# Patient Record
Sex: MALE | Race: ASIAN | NOT HISPANIC OR LATINO
[De-identification: names, ages, dates, MRNs, and addresses within clinical notes are randomized per-mention and may not be internally consistent; named-entity substitution may affect disease eponyms.]

---

## 2021-04-05 ENCOUNTER — APPOINTMENT (OUTPATIENT)
Dept: DISASTER EMERGENCY | Facility: CLINIC | Age: 46
End: 2021-04-05

## 2021-04-05 DIAGNOSIS — Z01.818 ENCOUNTER FOR OTHER PREPROCEDURAL EXAMINATION: ICD-10-CM

## 2021-04-05 PROBLEM — Z00.00 ENCOUNTER FOR PREVENTIVE HEALTH EXAMINATION: Status: ACTIVE | Noted: 2021-04-05

## 2021-04-08 ENCOUNTER — OUTPATIENT (OUTPATIENT)
Dept: OUTPATIENT SERVICES | Facility: HOSPITAL | Age: 46
LOS: 1 days | End: 2021-04-08

## 2021-04-08 DIAGNOSIS — Z20.822 CONTACT WITH AND (SUSPECTED) EXPOSURE TO COVID-19: ICD-10-CM

## 2021-04-08 LAB — SARS-COV-2 RNA SPEC QL NAA+PROBE: SIGNIFICANT CHANGE UP

## 2021-04-19 ENCOUNTER — INPATIENT (INPATIENT)
Facility: HOSPITAL | Age: 46
LOS: 1 days | Discharge: HOME | End: 2021-04-21
Attending: SURGERY | Admitting: SURGERY
Payer: MEDICAID

## 2021-04-19 VITALS
DIASTOLIC BLOOD PRESSURE: 97 MMHG | SYSTOLIC BLOOD PRESSURE: 149 MMHG | HEART RATE: 73 BPM | RESPIRATION RATE: 16 BRPM | OXYGEN SATURATION: 100 % | TEMPERATURE: 98 F

## 2021-04-19 DIAGNOSIS — Z98.890 OTHER SPECIFIED POSTPROCEDURAL STATES: Chronic | ICD-10-CM

## 2021-04-19 DIAGNOSIS — K85.90 ACUTE PANCREATITIS WITHOUT NECROSIS OR INFECTION, UNSPECIFIED: ICD-10-CM

## 2021-04-19 LAB
ALBUMIN SERPL ELPH-MCNC: 4.5 G/DL — SIGNIFICANT CHANGE UP (ref 3.3–5)
ALP SERPL-CCNC: 238 U/L — HIGH (ref 40–120)
ALT FLD-CCNC: 602 U/L — HIGH (ref 4–41)
ANION GAP SERPL CALC-SCNC: 11 MMOL/L — SIGNIFICANT CHANGE UP (ref 7–14)
APTT BLD: 29.1 SEC — SIGNIFICANT CHANGE UP (ref 27–36.3)
AST SERPL-CCNC: 261 U/L — HIGH (ref 4–40)
BASOPHILS # BLD AUTO: 0.03 K/UL — SIGNIFICANT CHANGE UP (ref 0–0.2)
BASOPHILS NFR BLD AUTO: 0.3 % — SIGNIFICANT CHANGE UP (ref 0–2)
BILIRUB SERPL-MCNC: 2.3 MG/DL — HIGH (ref 0.2–1.2)
BLD GP AB SCN SERPL QL: NEGATIVE — SIGNIFICANT CHANGE UP
BUN SERPL-MCNC: 8 MG/DL — SIGNIFICANT CHANGE UP (ref 7–23)
CALCIUM SERPL-MCNC: 9.5 MG/DL — SIGNIFICANT CHANGE UP (ref 8.4–10.5)
CHLORIDE SERPL-SCNC: 101 MMOL/L — SIGNIFICANT CHANGE UP (ref 98–107)
CO2 SERPL-SCNC: 23 MMOL/L — SIGNIFICANT CHANGE UP (ref 22–31)
CREAT SERPL-MCNC: 0.79 MG/DL — SIGNIFICANT CHANGE UP (ref 0.5–1.3)
EOSINOPHIL # BLD AUTO: 0.03 K/UL — SIGNIFICANT CHANGE UP (ref 0–0.5)
EOSINOPHIL NFR BLD AUTO: 0.3 % — SIGNIFICANT CHANGE UP (ref 0–6)
GLUCOSE SERPL-MCNC: 132 MG/DL — HIGH (ref 70–99)
HCT VFR BLD CALC: 43.1 % — SIGNIFICANT CHANGE UP (ref 39–50)
HGB BLD-MCNC: 14.1 G/DL — SIGNIFICANT CHANGE UP (ref 13–17)
IANC: 7.09 K/UL — SIGNIFICANT CHANGE UP (ref 1.5–8.5)
IMM GRANULOCYTES NFR BLD AUTO: 0.2 % — SIGNIFICANT CHANGE UP (ref 0–1.5)
INR BLD: 1.12 RATIO — SIGNIFICANT CHANGE UP (ref 0.88–1.16)
LYMPHOCYTES # BLD AUTO: 1.19 K/UL — SIGNIFICANT CHANGE UP (ref 1–3.3)
LYMPHOCYTES # BLD AUTO: 13.1 % — SIGNIFICANT CHANGE UP (ref 13–44)
MCHC RBC-ENTMCNC: 28.5 PG — SIGNIFICANT CHANGE UP (ref 27–34)
MCHC RBC-ENTMCNC: 32.7 GM/DL — SIGNIFICANT CHANGE UP (ref 32–36)
MCV RBC AUTO: 87.1 FL — SIGNIFICANT CHANGE UP (ref 80–100)
MONOCYTES # BLD AUTO: 0.71 K/UL — SIGNIFICANT CHANGE UP (ref 0–0.9)
MONOCYTES NFR BLD AUTO: 7.8 % — SIGNIFICANT CHANGE UP (ref 2–14)
NEUTROPHILS # BLD AUTO: 7.09 K/UL — SIGNIFICANT CHANGE UP (ref 1.8–7.4)
NEUTROPHILS NFR BLD AUTO: 78.3 % — HIGH (ref 43–77)
NRBC # BLD: 0 /100 WBCS — SIGNIFICANT CHANGE UP
NRBC # FLD: 0 K/UL — SIGNIFICANT CHANGE UP
PLATELET # BLD AUTO: 182 K/UL — SIGNIFICANT CHANGE UP (ref 150–400)
POTASSIUM SERPL-MCNC: 4 MMOL/L — SIGNIFICANT CHANGE UP (ref 3.5–5.3)
POTASSIUM SERPL-SCNC: 4 MMOL/L — SIGNIFICANT CHANGE UP (ref 3.5–5.3)
PROT SERPL-MCNC: 7.1 G/DL — SIGNIFICANT CHANGE UP (ref 6–8.3)
PROTHROM AB SERPL-ACNC: 12.7 SEC — SIGNIFICANT CHANGE UP (ref 10.6–13.6)
RBC # BLD: 4.95 M/UL — SIGNIFICANT CHANGE UP (ref 4.2–5.8)
RBC # FLD: 13.2 % — SIGNIFICANT CHANGE UP (ref 10.3–14.5)
RH IG SCN BLD-IMP: POSITIVE — SIGNIFICANT CHANGE UP
SODIUM SERPL-SCNC: 135 MMOL/L — SIGNIFICANT CHANGE UP (ref 135–145)
TROPONIN T, HIGH SENSITIVITY RESULT: 7 NG/L — SIGNIFICANT CHANGE UP
TROPONIN T, HIGH SENSITIVITY RESULT: 7 NG/L — SIGNIFICANT CHANGE UP
WBC # BLD: 9.07 K/UL — SIGNIFICANT CHANGE UP (ref 3.8–10.5)
WBC # FLD AUTO: 9.07 K/UL — SIGNIFICANT CHANGE UP (ref 3.8–10.5)

## 2021-04-19 PROCEDURE — 99223 1ST HOSP IP/OBS HIGH 75: CPT | Mod: GC

## 2021-04-19 PROCEDURE — 76705 ECHO EXAM OF ABDOMEN: CPT | Mod: 26

## 2021-04-19 PROCEDURE — 71046 X-RAY EXAM CHEST 2 VIEWS: CPT | Mod: 26

## 2021-04-19 PROCEDURE — 93010 ELECTROCARDIOGRAM REPORT: CPT

## 2021-04-19 PROCEDURE — 99285 EMERGENCY DEPT VISIT HI MDM: CPT | Mod: 25

## 2021-04-19 PROCEDURE — 74177 CT ABD & PELVIS W/CONTRAST: CPT | Mod: 26

## 2021-04-19 RX ORDER — ACETAMINOPHEN 500 MG
1000 TABLET ORAL ONCE
Refills: 0 | Status: DISCONTINUED | OUTPATIENT
Start: 2021-04-19 | End: 2021-04-21

## 2021-04-19 RX ORDER — ENOXAPARIN SODIUM 100 MG/ML
40 INJECTION SUBCUTANEOUS DAILY
Refills: 0 | Status: DISCONTINUED | OUTPATIENT
Start: 2021-04-19 | End: 2021-04-21

## 2021-04-19 RX ORDER — SODIUM CHLORIDE 9 MG/ML
1000 INJECTION, SOLUTION INTRAVENOUS
Refills: 0 | Status: DISCONTINUED | OUTPATIENT
Start: 2021-04-19 | End: 2021-04-21

## 2021-04-19 RX ORDER — FAMOTIDINE 10 MG/ML
20 INJECTION INTRAVENOUS ONCE
Refills: 0 | Status: COMPLETED | OUTPATIENT
Start: 2021-04-19 | End: 2021-04-19

## 2021-04-19 RX ADMIN — SODIUM CHLORIDE 100 MILLILITER(S): 9 INJECTION, SOLUTION INTRAVENOUS at 22:38

## 2021-04-19 RX ADMIN — FAMOTIDINE 20 MILLIGRAM(S): 10 INJECTION INTRAVENOUS at 14:02

## 2021-04-19 NOTE — ED ADULT TRIAGE NOTE - CHIEF COMPLAINT QUOTE
pt c/o of 3 days chest pain, non-radiating. pt states he has GERD pain, takes medicine with relief, over the past 3 days, no relief with meds. denies n/.v/ headache.

## 2021-04-19 NOTE — H&P ADULT - NSHPLABSRESULTS_GEN_ALL_CORE
LABS:                        14.1   9.07  )-----------( 182      ( 19 Apr 2021 14:18 )             43.1     04-19    135  |  101  |  8   ----------------------------<  132<H>  4.0   |  23  |  0.79    Ca    9.5      19 Apr 2021 14:18    TPro  7.1  /  Alb  4.5  /  TBili  2.3<H>  /  DBili  x   /  AST  261<H>  /  ALT  602<H>  /  AlkPhos  238<H>  04-19    PT/INR - ( 19 Apr 2021 17:35 )   PT: 12.7 sec;   INR: 1.12 ratio      PTT - ( 19 Apr 2021 17:35 )  PTT:29.1 sec      RADIOLOGY     US Abdomen Limited (ED) (04.19.21 @ 16:02)  IMPRESSION:  Wall echo shadow suggestive of diffuse cholelithiasis, pericholecystic fluid and thickened gall bladder wall. These images with the elevation of LFTs requires further imaging with CT abdomen to r/o cholecystitis.    CT Abdomen and Pelvis w/ IV Cont (04.19.21 @ 20:17)   PANCREAS: Within normal limits.  BOWEL: No bowel obstruction. The appendix is normal.  RETROPERITONEUM/LYMPH NODES: No lymphadenopathy.  ABDOMINAL WALL: Within normal limits.  IMPRESSION: No acute abnormality in the abdomen or pelvis.

## 2021-04-19 NOTE — H&P ADULT - ASSESSMENT
[address] [place] [address] EMERGENCY DEPT 
[facility] [address] [address] 
[phone] Work/School Note Date: 10/15/2020 To Whom It May concern: 
 
Mary Lou Garrett was seen and treated today in the emergency room by the following provider(s): 
Nurse Practitioner: Shawn Galicia NP. Mary Lou Garrett is excused from work/school on 10/15/20 and 10/16/20. She is medically clear to return to work/school on 10/17/2020. Sincerely, Abhishek Garcia NP  
 
 
 Assessment:  46M PMH HLD presents with epigastric abdominal pain, found to have gallstone pancreatitis.     Plan:  - Admit to B Team Surgery, Dr. Izaguirre  - Conservative management: IV fluids, bowel rest  - Radiographic workup with MRCP   - No abx for now, US findings likely reactive to pancreatitis and not acute cholecystitis  - Serial abdominal exams  - Pain control PRN     Discussed with Dr. Izaguirre    Please contact B Team Surgery (p. [phone]) with any questions.     Leticia Hollis, PGY2  B Team Surgery  Pager [phone]   Assessment:  46M PMH HLD presents with epigastric abdominal pain, found to have gallstone pancreatitis.     Plan:  - Admit to B Team Surgery, Dr. Izaguirre  - Conservative management: IV fluids, bowel rest  - Radiographic workup with MRCP   - No abx for now, US findings likely reactive to pancreatitis and not acute cholecystitis  - Patient will need eventual cholecystectomy after acute process improves  - Serial abdominal exams  - Pain control PRN     Discussed with Dr. Izaguirre    Please contact B Team Surgery (p. [phone]) with any questions.     Leticia Hollis, PGY2  B Team Surgery  Pager [phone]

## 2021-04-19 NOTE — ED PROVIDER NOTE - NS ED ROS FT
ROS:  GENERAL: No fever, no chills  EYES: no change in vision  HEENT: no trouble swallowing, no trouble speaking  CARDIAC: no chest pain  PULMONARY: no cough, no shortness of breath  GI: +epigastric burning and feeling of gas to mid abd, no nausea, no vomiting, no diarrhea, no constipation  : No dysuria, no frequency, no change in appearance, or odor of urine  SKIN: no rashes  NEURO: no headache, no weakness  MSK: No joint pain

## 2021-04-19 NOTE — H&P ADULT - HISTORY OF PRESENT ILLNESS
B TEAM SURGERY H&P NOTE  MD SHARPE  |  [id number]  |  04-19-21 @ 21:02    HPI: 46M PMH HLD presents with epigastric abdominal pain. Patient states for the last few days he has had "gas pains" to his mid abdomen intermittently and burning discomfort to his epigastric region. Patient repeatedly denies any pain in his chest. Pain associated with decreased appetite. Last PO yesterday. Denies any nausea or vomiting. No fevers, chills. States he possibly had similar episode in the past saw his PMD for that improved on its own. Denies any h/o alcohol. No intraabdominal surgeries, but has had soft-tissue lesions removed from abdominal subq.    INTERVAL EVENTS: In the ED, hemodynamically stable. Labs significant for lipase >3000, transaminitis, elevated Tbili, WBC wnl. Bedside US performed demonstrating possible cholelithiasis. CTAP largely normal. General surgery consulted for gallstone pancreatitis.

## 2021-04-19 NOTE — ED PROVIDER NOTE - PHYSICAL EXAMINATION
vital signs: T(C): 36.7 (04-19-21 @ 14:02), Max: 36.8 (04-19-21 @ 11:45)  HR: 77 (04-19-21 @ 14:02) (73 - 77)  BP: 133/87 (04-19-21 @ 14:02) (133/87 - 149/97)  BP(mean): --  RR: 18 (04-19-21 @ 14:02) (16 - 18)  SpO2: 99% (04-19-21 @ 14:02) (99% - 100%)  GEN - NAD; well appearing; A+O x3   HEAD - NC/AT   EYES- PERRL, EOMI  ENT: Airway patent, mmm, Oral cavity and pharynx normal. No inflammation, swelling, exudate, or lesions.  NECK: Neck supple, non-tender without lymphadenopathy, no masses.  PULMONARY - CTA b/l, symmetric breath sounds.   CARDIAC -s1s2, RRR, no M,G,R  ABDOMEN - +BS, ND, NT, soft, no guarding, no rebound, no masses   BACK - no CVA tenderness, Normal  spine   EXTREMITIES - FROM, symmetric pulses, capillary refill < 2 seconds, no edema   SKIN - no rash or bruising   NEUROLOGIC - alert, speech clear, no focal deficits  PSYCH -nl mood/affect, nl insight. vital signs: T(C): 36.7 (04-19-21 @ 14:02), Max: 36.8 (04-19-21 @ 11:45)  HR: 77 (04-19-21 @ 14:02) (73 - 77)  BP: 133/87 (04-19-21 @ 14:02) (133/87 - 149/97)  BP(mean): --  RR: 18 (04-19-21 @ 14:02) (16 - 18)  SpO2: 99% (04-19-21 @ 14:02) (99% - 100%)  GEN - NAD; well appearing; A+O x3   HEAD - NC/AT   EYES- PERRL, EOMI  ENT: Airway patent, mmm, Oral cavity and pharynx normal. No inflammation, swelling, exudate, or lesions.    NECK: Neck supple, non-tender without lymphadenopathy, no masses.  PULMONARY - CTA b/l, symmetric breath sounds.   CARDIAC -s1s2, RRR, no M,G,R  ABDOMEN - +BS, ND, NT, soft, no guarding, no rebound, no masses   BACK - no CVA tenderness, Normal  spine   EXTREMITIES - FROM, symmetric pulses, capillary refill < 2 seconds, no edema   SKIN - no rash or bruising   NEUROLOGIC - alert, speech clear, no focal deficits  PSYCH -nl mood/affect, nl insight.

## 2021-04-19 NOTE — ED PROVIDER NOTE - CLINICAL SUMMARY MEDICAL DECISION MAKING FREE TEXT BOX
Patient presents to the ed with epigastric burning discomfort and intermittent sensation of gas to his mid abd. patient well appearing, vss, nontender abd, nl cp exam. Despite triage note when speaking with him via , patient denies any pain in chest. Plan for labs, cxr, ekg, gi meds-eval for anemia, elec disturbance, pancreatitis, biliary colic, more likely gastritis, very low suspicion for acs, ekg/trop to eval. hs low. wells low, perc negative. Patient presents to the ed with epigastric burning discomfort and intermittent sensation of gas to his mid abd. patient well appearing, vss, nontender abd, nl cp exam. Despite triage note when speaking with him via , patient denies any pain in chest. Plan for labs, cxr, ekg, gi meds-eval for anemia, elec disturbance, pancreatitis, biliary colic, gastritis, very low suspicion for acs, ekg/trop to eval. hs low. wells low, perc negative.

## 2021-04-19 NOTE — ED PROVIDER NOTE - PROGRESS NOTE DETAILS
CLEMENT Chang: pt resting comfortably NAD, labs showing elevated LFTs and lipase >3000.  CT pending, surgery paged for consult.  Pt advised of results and need for admission conducted via  ID# [id number]. CLEMENT Chang: pt seen and evaluated by surgery advised to admit to Dr Izaguirre.  Pt resting comfortably NAD.

## 2021-04-19 NOTE — ED PROVIDER NOTE - OBJECTIVE STATEMENT
47 y/o m presents to the ed with epigastric burning and abd discomfort. Patient states for the last few days he has had "gas pains" to his mid abd region intermittently and burning discomfort to his epigastric region. Patient repeatedly denies any pain in his chest. Does state that he has occ sob when he has the discomfort. No fevers, chills, ha, cough, vomiting, diarrhea, dysuria, le edema. Has h/o stomach problems in past on medication but hasn't been working recently.

## 2021-04-19 NOTE — H&P ADULT - NSHPPHYSICALEXAM_GEN_ALL_CORE
Vital Signs Last 24 Hrs  T(C): 36.7 (19 Apr 2021 14:02), Max: 36.8 (19 Apr 2021 11:45)  T(F): 98 (19 Apr 2021 14:02), Max: 98.3 (19 Apr 2021 11:45)  HR: 70 (19 Apr 2021 19:46) (70 - 77)  BP: 121/85 (19 Apr 2021 19:46) (121/85 - 149/97)  RR: 18 (19 Apr 2021 19:46) (16 - 18)  SpO2: 99% (19 Apr 2021 19:46) (99% - 100%)    PHYSICAL EXAM:   GEN: No acute distress, resting comfortably   HEENT: NCAT   NEURO: no gross deficits   CV: RRR  RESP: No increased work of breathing   ABD: soft, nontender, nondistended, no rebound or guarding   EXT: warm and well perfused

## 2021-04-19 NOTE — ED ADULT NURSE NOTE - OBJECTIVE STATEMENT
46 year old male A&Ox4, ambulatory with PHX HLD C/O Epigastric ABD burning that radiated to chest x 3 days. Denies SOB, fevers, chills, cough, N/V/D. Md evaluated, VS as noted. 20 G IV placed to R arm, labs sent, medicated as per orders. Comfort measures provided.

## 2021-04-20 ENCOUNTER — TRANSCRIPTION ENCOUNTER (OUTPATIENT)
Age: 46
End: 2021-04-20

## 2021-04-20 LAB
ALBUMIN SERPL ELPH-MCNC: 4 G/DL — SIGNIFICANT CHANGE UP (ref 3.3–5)
ALP SERPL-CCNC: 228 U/L — HIGH (ref 40–120)
ALT FLD-CCNC: 470 U/L — HIGH (ref 4–41)
ANION GAP SERPL CALC-SCNC: 10 MMOL/L — SIGNIFICANT CHANGE UP (ref 7–14)
AST SERPL-CCNC: 143 U/L — HIGH (ref 4–40)
BILIRUB SERPL-MCNC: 1.1 MG/DL — SIGNIFICANT CHANGE UP (ref 0.2–1.2)
BUN SERPL-MCNC: 13 MG/DL — SIGNIFICANT CHANGE UP (ref 7–23)
CALCIUM SERPL-MCNC: 9.2 MG/DL — SIGNIFICANT CHANGE UP (ref 8.4–10.5)
CHLORIDE SERPL-SCNC: 102 MMOL/L — SIGNIFICANT CHANGE UP (ref 98–107)
CO2 SERPL-SCNC: 24 MMOL/L — SIGNIFICANT CHANGE UP (ref 22–31)
CREAT SERPL-MCNC: 0.96 MG/DL — SIGNIFICANT CHANGE UP (ref 0.5–1.3)
GLUCOSE SERPL-MCNC: 83 MG/DL — SIGNIFICANT CHANGE UP (ref 70–99)
HCT VFR BLD CALC: 43.3 % — SIGNIFICANT CHANGE UP (ref 39–50)
HGB BLD-MCNC: 14 G/DL — SIGNIFICANT CHANGE UP (ref 13–17)
MAGNESIUM SERPL-MCNC: 2.2 MG/DL — SIGNIFICANT CHANGE UP (ref 1.6–2.6)
MCHC RBC-ENTMCNC: 28.6 PG — SIGNIFICANT CHANGE UP (ref 27–34)
MCHC RBC-ENTMCNC: 32.3 GM/DL — SIGNIFICANT CHANGE UP (ref 32–36)
MCV RBC AUTO: 88.5 FL — SIGNIFICANT CHANGE UP (ref 80–100)
NRBC # BLD: 0 /100 WBCS — SIGNIFICANT CHANGE UP
NRBC # FLD: 0 K/UL — SIGNIFICANT CHANGE UP
PHOSPHATE SERPL-MCNC: 3.3 MG/DL — SIGNIFICANT CHANGE UP (ref 2.5–4.5)
PLATELET # BLD AUTO: 181 K/UL — SIGNIFICANT CHANGE UP (ref 150–400)
POTASSIUM SERPL-MCNC: 4.1 MMOL/L — SIGNIFICANT CHANGE UP (ref 3.5–5.3)
POTASSIUM SERPL-SCNC: 4.1 MMOL/L — SIGNIFICANT CHANGE UP (ref 3.5–5.3)
PROT SERPL-MCNC: 7.1 G/DL — SIGNIFICANT CHANGE UP (ref 6–8.3)
RBC # BLD: 4.89 M/UL — SIGNIFICANT CHANGE UP (ref 4.2–5.8)
RBC # FLD: 13.4 % — SIGNIFICANT CHANGE UP (ref 10.3–14.5)
SARS-COV-2 RNA SPEC QL NAA+PROBE: SIGNIFICANT CHANGE UP
SODIUM SERPL-SCNC: 136 MMOL/L — SIGNIFICANT CHANGE UP (ref 135–145)
WBC # BLD: 7.82 K/UL — SIGNIFICANT CHANGE UP (ref 3.8–10.5)
WBC # FLD AUTO: 7.82 K/UL — SIGNIFICANT CHANGE UP (ref 3.8–10.5)

## 2021-04-20 PROCEDURE — 99232 SBSQ HOSP IP/OBS MODERATE 35: CPT | Mod: 57

## 2021-04-20 RX ADMIN — SODIUM CHLORIDE 100 MILLILITER(S): 9 INJECTION, SOLUTION INTRAVENOUS at 17:12

## 2021-04-20 RX ADMIN — ENOXAPARIN SODIUM 40 MILLIGRAM(S): 100 INJECTION SUBCUTANEOUS at 11:36

## 2021-04-20 NOTE — PROGRESS NOTE ADULT - SUBJECTIVE AND OBJECTIVE BOX
A Team Surgery Daily Progress Note  =====================================================    SUBJECTIVE: Patient seen and examined at bedside on AM rounds. Patient reports that they're feeling well. Tolerating diet, denies nausea, vomiting. OOB/Ambulating as tolerated. Denies fever, chills.     24 HOUR EVENTS:  no acute events overnight    MEDICATIONS  (STANDING):  enoxaparin Injectable 40 milliGRAM(s) SubCutaneous daily  lactated ringers. 1000 milliLiter(s) (100 mL/Hr) IV Continuous <Continuous>    MEDICATIONS  (PRN):  acetaminophen  IVPB .. 1000 milliGRAM(s) IV Intermittent once PRN Mild Pain (1 - 3), Moderate Pain (4 - 6)      OBJECTIVE:    Vital Signs Last 24 Hrs  T(C): 36.7 (20 Apr 2021 01:35), Max: 36.8 (19 Apr 2021 11:45)  T(F): 98 (20 Apr 2021 01:35), Max: 98.3 (19 Apr 2021 11:45)  HR: 67 (20 Apr 2021 01:35) (67 - 77)  BP: 125/70 (20 Apr 2021 01:35) (121/75 - 149/97)  BP(mean): --  RR: 18 (20 Apr 2021 01:35) (16 - 19)  SpO2: 98% (20 Apr 2021 01:35) (98% - 100%)    PHYSICAL EXAM:  GEN: No acute distress, resting comfortably   HEENT: NCAT   NEURO: no gross deficits   CV: RRR  RESP: No increased work of breathing   ABD: soft, nontender, nondistended, no rebound or guarding   EXT: warm and well perfused        I&O's Detail      Daily     Daily     LABS:                        14.1   9.07  )-----------( 182      ( 19 Apr 2021 14:18 )             43.1     04-19    135  |  101  |  8   ----------------------------<  132<H>  4.0   |  23  |  0.79    Ca    9.5      19 Apr 2021 14:18    TPro  7.1  /  Alb  4.5  /  TBili  2.3<H>  /  DBili  x   /  AST  261<H>  /  ALT  602<H>  /  AlkPhos  238<H>  04-19    PT/INR - ( 19 Apr 2021 17:35 )   PT: 12.7 sec;   INR: 1.12 ratio         PTT - ( 19 Apr 2021 17:35 )  PTT:29.1 sec                     A Team Surgery Daily Progress Note  =====================================================    SUBJECTIVE: Patient seen and examined at bedside on AM rounds. No acute events overnight. Patient reports that they're feeling well. Tolerating diet, denies nausea, vomiting.  Denies fever, chills.     MEDICATIONS  (STANDING):  enoxaparin Injectable 40 milliGRAM(s) SubCutaneous daily  lactated ringers. 1000 milliLiter(s) (100 mL/Hr) IV Continuous <Continuous>    MEDICATIONS  (PRN):  acetaminophen  IVPB .. 1000 milliGRAM(s) IV Intermittent once PRN Mild Pain (1 - 3), Moderate Pain (4 - 6)      OBJECTIVE:    Vital Signs Last 24 Hrs  T(C): 37.1 (04-20-21 @ 04:25), Max: 37.1 (04-20-21 @ 04:25)  HR: 65 (04-20-21 @ 04:25) (65 - 77)  BP: 138/68 (04-20-21 @ 04:25) (121/75 - 149/97)  ABP: --  ABP(mean): --  RR: 18 (04-20-21 @ 04:25) (16 - 19)  SpO2: 99% (04-20-21 @ 04:25) (98% - 100%)  Wt(kg): --  CVP(mm Hg): --      04-19 @ 07:01  -  04-20 @ 07:00  --------------------------------------------------------  IN:    IV PiggyBack: 300 mL  Total IN: 300 mL    OUT:    Voided (mL): 0 mL  Total OUT: 0 mL    Total NET: 300 mL      PHYSICAL EXAM:  GEN: No acute distress, resting comfortably   HEENT: NCAT   NEURO: no gross deficits   CV: RRR  RESP: No increased work of breathing   ABD: soft, nontender, nondistended, no rebound or guarding   EXT: warm and well perfused      LABS:             CBC (04-20 @ 06:01)                              14.0                           7.82    )----------------(  181        --    % Neutrophils, --    % Lymphocytes, ANC: --                                  43.3    CBC (04-19 @ 14:18)                              14.1                           9.07    )----------------(  182        78.3<H>% Neutrophils, 13.1  % Lymphocytes, ANC: 7.09                                43.1      BMP (04-20 @ 06:01)             136     |  102     |  13    		Ca++ --      Ca 9.2                ---------------------------------( 83    		Mg 2.2                4.1     |  24      |  0.96  			Ph 3.3     BMP (04-19 @ 14:18)             135     |  101     |  8     		Ca++ --      Ca 9.5                ---------------------------------( 132<H>		Mg --                 4.0     |  23      |  0.79  			Ph --        LFTs (04-20 @ 06:01)      TPro 7.1 / Alb 4.0 / TBili 1.1 / DBili -- / <H> / <H> / AlkPhos 228<H>  LFTs (04-19 @ 14:18)      TPro 7.1 / Alb 4.5 / TBili 2.3<H> / DBili -- / <H> / <H> / AlkPhos 238<H>    Coags (04-19 @ 17:35)  aPTT 29.1 / INR 1.12 / PT 12.7

## 2021-04-20 NOTE — PROGRESS NOTE ADULT - ATTENDING COMMENTS
GS pancreatitis  a.  Diet as tolerated  b.  CT reviewed Await MRCP, note of slight increase in TB, low suspicion for CBD stone   c.  Plan for laparoscopic cholecystectomy mili

## 2021-04-20 NOTE — PROGRESS NOTE ADULT - ASSESSMENT
Assessment and Plan:  46M PMH HLD presents with epigastric abdominal pain, found to have gallstone pancreatitis.     - Conservative management: IV fluids, bowel rest  - Radiographic workup with MRCP   - No abx for now, US findings likely reactive to pancreatitis and not acute cholecystitis  - Patient will need eventual cholecystectomy after acute process improves  - Serial abdominal exams  - Pain control PRN     A team surgery [id number] Assessment and Plan:  46M PMH HLD presents with epigastric abdominal pain, found to have gallstone pancreatitis.     -Will give CLD; NPO at midnight for OR tomorrow for lap nallely   - needs MRCP today   - No abx for now, US findings likely reactive to pancreatitis and not acute cholecystitis  - Serial abdominal exams  - Pain control PRN   -DVT PPX with LVX    B team surgery   [id number]

## 2021-04-20 NOTE — CHART NOTE - NSCHARTNOTEFT_GEN_A_CORE
Pre-operative Note    - Pre-operative Diagnosis: Gallstone pancreatitis    - Procedure: Laparoscopic cholecystectomy    - Labs:                        14.0   7.82  )-----------( 181      ( 2021 06:01 )             43.3         136  |  102  |  13  ----------------------------<  83  4.1   |  24  |  0.96    Ca    9.2      2021 06:01  Phos  3.3       Mg     2.2         TPro  7.1  /  Alb  4.0  /  TBili  1.1  /  DBili  x   /  AST  143<H>  /  ALT  470<H>  /  AlkPhos  228<H>      PT/INR - ( 2021 17:35 )   PT: 12.7 sec;   INR: 1.12 ratio         PTT - ( 2021 17:35 )  PTT:29.1 sec  Type & Screen #1:   Type & Screen #2:    - CXR:     - EK/19    - COVID:  negative     - Blood: Not needed.     - Orders:  > NPO at midnight  > IVF - currently on IVF  > Perioperative antibiotics not needed.  > Morning Labs: CBC, BMP, coags, type & screen ordered  > Diabetic orders - non diabetic    - Permits:  > Consent to be placed in chart  > Case to be booked for

## 2021-04-21 ENCOUNTER — RESULT REVIEW (OUTPATIENT)
Age: 46
End: 2021-04-21

## 2021-04-21 VITALS
SYSTOLIC BLOOD PRESSURE: 149 MMHG | DIASTOLIC BLOOD PRESSURE: 86 MMHG | RESPIRATION RATE: 26 BRPM | OXYGEN SATURATION: 96 % | HEART RATE: 98 BPM

## 2021-04-21 LAB
ALBUMIN SERPL ELPH-MCNC: 3.6 G/DL — SIGNIFICANT CHANGE UP (ref 3.3–5)
ALP SERPL-CCNC: 187 U/L — HIGH (ref 40–120)
ALT FLD-CCNC: 315 U/L — HIGH (ref 4–41)
ANION GAP SERPL CALC-SCNC: 10 MMOL/L — SIGNIFICANT CHANGE UP (ref 7–14)
APTT BLD: 28 SEC — SIGNIFICANT CHANGE UP (ref 27–36.3)
AST SERPL-CCNC: 63 U/L — HIGH (ref 4–40)
BILIRUB SERPL-MCNC: 0.5 MG/DL — SIGNIFICANT CHANGE UP (ref 0.2–1.2)
BLD GP AB SCN SERPL QL: NEGATIVE — SIGNIFICANT CHANGE UP
BUN SERPL-MCNC: 12 MG/DL — SIGNIFICANT CHANGE UP (ref 7–23)
CALCIUM SERPL-MCNC: 8.8 MG/DL — SIGNIFICANT CHANGE UP (ref 8.4–10.5)
CHLORIDE SERPL-SCNC: 106 MMOL/L — SIGNIFICANT CHANGE UP (ref 98–107)
CO2 SERPL-SCNC: 23 MMOL/L — SIGNIFICANT CHANGE UP (ref 22–31)
COVID-19 SPIKE DOMAIN AB INTERP: POSITIVE
COVID-19 SPIKE DOMAIN ANTIBODY RESULT: 109 U/ML — HIGH
CREAT SERPL-MCNC: 0.81 MG/DL — SIGNIFICANT CHANGE UP (ref 0.5–1.3)
GLUCOSE SERPL-MCNC: 115 MG/DL — HIGH (ref 70–99)
HCT VFR BLD CALC: 40.6 % — SIGNIFICANT CHANGE UP (ref 39–50)
HGB BLD-MCNC: 13.3 G/DL — SIGNIFICANT CHANGE UP (ref 13–17)
INR BLD: 1.08 RATIO — SIGNIFICANT CHANGE UP (ref 0.88–1.16)
MAGNESIUM SERPL-MCNC: 2.1 MG/DL — SIGNIFICANT CHANGE UP (ref 1.6–2.6)
MCHC RBC-ENTMCNC: 28.4 PG — SIGNIFICANT CHANGE UP (ref 27–34)
MCHC RBC-ENTMCNC: 32.8 GM/DL — SIGNIFICANT CHANGE UP (ref 32–36)
MCV RBC AUTO: 86.6 FL — SIGNIFICANT CHANGE UP (ref 80–100)
NRBC # BLD: 0 /100 WBCS — SIGNIFICANT CHANGE UP
NRBC # FLD: 0 K/UL — SIGNIFICANT CHANGE UP
PHOSPHATE SERPL-MCNC: 3.5 MG/DL — SIGNIFICANT CHANGE UP (ref 2.5–4.5)
PLATELET # BLD AUTO: 164 K/UL — SIGNIFICANT CHANGE UP (ref 150–400)
POTASSIUM SERPL-MCNC: 4.2 MMOL/L — SIGNIFICANT CHANGE UP (ref 3.5–5.3)
POTASSIUM SERPL-SCNC: 4.2 MMOL/L — SIGNIFICANT CHANGE UP (ref 3.5–5.3)
PROT SERPL-MCNC: 6.5 G/DL — SIGNIFICANT CHANGE UP (ref 6–8.3)
PROTHROM AB SERPL-ACNC: 12.4 SEC — SIGNIFICANT CHANGE UP (ref 10.6–13.6)
RBC # BLD: 4.69 M/UL — SIGNIFICANT CHANGE UP (ref 4.2–5.8)
RBC # FLD: 13.3 % — SIGNIFICANT CHANGE UP (ref 10.3–14.5)
RH IG SCN BLD-IMP: POSITIVE — SIGNIFICANT CHANGE UP
SARS-COV-2 IGG+IGM SERPL QL IA: 109 U/ML — HIGH
SARS-COV-2 IGG+IGM SERPL QL IA: POSITIVE
SODIUM SERPL-SCNC: 139 MMOL/L — SIGNIFICANT CHANGE UP (ref 135–145)
WBC # BLD: 6.12 K/UL — SIGNIFICANT CHANGE UP (ref 3.8–10.5)
WBC # FLD AUTO: 6.12 K/UL — SIGNIFICANT CHANGE UP (ref 3.8–10.5)

## 2021-04-21 PROCEDURE — 47562 LAPAROSCOPIC CHOLECYSTECTOMY: CPT

## 2021-04-21 PROCEDURE — 88304 TISSUE EXAM BY PATHOLOGIST: CPT | Mod: 26

## 2021-04-21 RX ORDER — OXYCODONE HYDROCHLORIDE 5 MG/1
1 TABLET ORAL
Qty: 6 | Refills: 0
Start: 2021-04-21

## 2021-04-21 RX ORDER — HYDRALAZINE HCL 50 MG
5 TABLET ORAL ONCE
Refills: 0 | Status: COMPLETED | OUTPATIENT
Start: 2021-04-21 | End: 2021-04-21

## 2021-04-21 RX ORDER — DEXTROSE MONOHYDRATE, SODIUM CHLORIDE, AND POTASSIUM CHLORIDE 50; .745; 4.5 G/1000ML; G/1000ML; G/1000ML
1000 INJECTION, SOLUTION INTRAVENOUS
Refills: 0 | Status: DISCONTINUED | OUTPATIENT
Start: 2021-04-21 | End: 2021-04-21

## 2021-04-21 RX ORDER — ONDANSETRON 8 MG/1
4 TABLET, FILM COATED ORAL ONCE
Refills: 0 | Status: DISCONTINUED | OUTPATIENT
Start: 2021-04-21 | End: 2021-04-21

## 2021-04-21 RX ORDER — SIMETHICONE 80 MG/1
80 TABLET, CHEWABLE ORAL ONCE
Refills: 0 | Status: COMPLETED | OUTPATIENT
Start: 2021-04-21 | End: 2021-04-21

## 2021-04-21 RX ORDER — OXYCODONE HYDROCHLORIDE 5 MG/1
1 TABLET ORAL
Qty: 5 | Refills: 0
Start: 2021-04-21

## 2021-04-21 RX ORDER — OXYCODONE HYDROCHLORIDE 5 MG/1
5 TABLET ORAL ONCE
Refills: 0 | Status: DISCONTINUED | OUTPATIENT
Start: 2021-04-21 | End: 2021-04-21

## 2021-04-21 RX ORDER — HYDROMORPHONE HYDROCHLORIDE 2 MG/ML
0.5 INJECTION INTRAMUSCULAR; INTRAVENOUS; SUBCUTANEOUS
Refills: 0 | Status: DISCONTINUED | OUTPATIENT
Start: 2021-04-21 | End: 2021-04-21

## 2021-04-21 RX ORDER — SODIUM CHLORIDE 9 MG/ML
1000 INJECTION, SOLUTION INTRAVENOUS
Refills: 0 | Status: DISCONTINUED | OUTPATIENT
Start: 2021-04-21 | End: 2021-04-21

## 2021-04-21 RX ADMIN — SIMETHICONE 80 MILLIGRAM(S): 80 TABLET, CHEWABLE ORAL at 19:39

## 2021-04-21 RX ADMIN — DEXTROSE MONOHYDRATE, SODIUM CHLORIDE, AND POTASSIUM CHLORIDE 100 MILLILITER(S): 50; .745; 4.5 INJECTION, SOLUTION INTRAVENOUS at 06:46

## 2021-04-21 RX ADMIN — Medication 5 MILLIGRAM(S): at 18:25

## 2021-04-21 RX ADMIN — OXYCODONE HYDROCHLORIDE 5 MILLIGRAM(S): 5 TABLET ORAL at 19:19

## 2021-04-21 NOTE — PRE-OP CHECKLIST - SELECT TESTS ORDERED
BMP/CBC/PT/PTT/INR/Type and Cross BMP/CBC/PT/PTT/INR/Type and Cross/EKG/COVID-19 BMP/CBC/PT/PTT/INR/Type and Cross/Type and Screen/EKG/COVID-19

## 2021-04-21 NOTE — ASU DISCHARGE PLAN (ADULT/PEDIATRIC) - CARE PROVIDER_API CALL
Jose Albert)  Surgery; Surgical Critical Care  [address] [place] [address]  Phone: [phone]  Fax: [phone]  Follow Up Time:

## 2021-04-21 NOTE — PROGRESS NOTE ADULT - ASSESSMENT
Assessment and Plan:  46M PMH HLD presents with epigastric abdominal pain, found to have gallstone pancreatitis.     -NPO at midnight for OR tomorrow for lap nallely   - needs MRCP  - No abx for now, US findings likely reactive to pancreatitis and not acute cholecystitis  - Serial abdominal exams  - Pain control PRN   -DVT PPX with LVX      B team surgery [id number] Assessment and Plan:  46M PMH HLD presents with epigastric abdominal pain, found to have gallstone pancreatitis.     -OR today for lap nallely   -NPO   - No abx for now, US findings likely reactive to pancreatitis and not acute cholecystitis  - Serial abdominal exams  - Pain control PRN   -DVT PPX with LVX      B team surgery   [id number]

## 2021-04-21 NOTE — ASU DISCHARGE PLAN (ADULT/PEDIATRIC) - ASU DC SPECIAL INSTRUCTIONSFT
Please allow water and soap to run over your incisions and pat them dry. Do not scrub the incisions.     You can take 650 mg of Tylenol every 6 hours alternating with 400 mg of ibuprofen every 6 hours as needed for pain. If you need additional pain control take 5 mg oxycodone every 4 hours as needed.    Follow up in 2 weeks with Dr. Albert.

## 2021-04-21 NOTE — PROGRESS NOTE ADULT - SUBJECTIVE AND OBJECTIVE BOX
B Team Surgery Daily Progress Note  =====================================================    SUBJECTIVE: Patient seen and examined at bedside on AM rounds. Patient reports that they're feeling well. NPO. denies nausea, vomiting.  OOB/Ambulating as tolerated. Denies fever, chills.     24 HOUR EVENTS:  Booked for OR tomorrow    MEDICATIONS  (STANDING):  enoxaparin Injectable 40 milliGRAM(s) SubCutaneous daily  lactated ringers. 1000 milliLiter(s) (100 mL/Hr) IV Continuous <Continuous>    MEDICATIONS  (PRN):  acetaminophen  IVPB .. 1000 milliGRAM(s) IV Intermittent once PRN Mild Pain (1 - 3), Moderate Pain (4 - 6)      OBJECTIVE:    Vital Signs Last 24 Hrs  T(C): 36.8 (20 Apr 2021 21:01), Max: 37.1 (20 Apr 2021 04:25)  T(F): 98.3 (20 Apr 2021 21:01), Max: 98.8 (20 Apr 2021 04:25)  HR: 71 (20 Apr 2021 21:01) (65 - 74)  BP: 147/87 (20 Apr 2021 21:01) (122/81 - 147/87)  BP(mean): --  RR: 20 (20 Apr 2021 21:01) (16 - 20)  SpO2: 100% (20 Apr 2021 21:01) (96% - 100%)    PHYSICAL EXAM:  GEN: No acute distress, resting comfortably   HEENT: NCAT   NEURO: no gross deficits   CV: RRR  RESP: No increased work of breathing   ABD: soft, nontender, nondistended, no rebound or guarding   EXT: warm and well perfused      I&O's Detail    19 Apr 2021 07:01  -  20 Apr 2021 07:00  --------------------------------------------------------  IN:    IV PiggyBack: 300 mL  Total IN: 300 mL    OUT:    Voided (mL): 0 mL  Total OUT: 0 mL    Total NET: 300 mL      20 Apr 2021 07:01  -  21 Apr 2021 01:07  --------------------------------------------------------  IN:    Lactated Ringers: 1200 mL  Total IN: 1200 mL    OUT:    Voided (mL): 600 mL  Total OUT: 600 mL    Total NET: 600 mL          Daily     Daily     LABS:                        14.0   7.82  )-----------( 181      ( 20 Apr 2021 06:01 )             43.3     04-20    136  |  102  |  13  ----------------------------<  83  4.1   |  24  |  0.96    Ca    9.2      20 Apr 2021 06:01  Phos  3.3     04-20  Mg     2.2     04-20    TPro  7.1  /  Alb  4.0  /  TBili  1.1  /  DBili  x   /  AST  143<H>  /  ALT  470<H>  /  AlkPhos  228<H>  04-20    PT/INR - ( 19 Apr 2021 17:35 )   PT: 12.7 sec;   INR: 1.12 ratio         PTT - ( 19 Apr 2021 17:35 )  PTT:29.1 sec                         B Team Surgery Daily Progress Note  =====================================================    SUBJECTIVE: Patient seen and examined at bedside on AM rounds. No acute events overnight. Denies abdominal pain, N/V, fever, chills, SOB, chest pain.       MEDICATIONS  (STANDING):  enoxaparin Injectable 40 milliGRAM(s) SubCutaneous daily  lactated ringers. 1000 milliLiter(s) (100 mL/Hr) IV Continuous <Continuous>    MEDICATIONS  (PRN):  acetaminophen  IVPB .. 1000 milliGRAM(s) IV Intermittent once PRN Mild Pain (1 - 3), Moderate Pain (4 - 6)      OBJECTIVE:    Vital Signs Last 24 Hrs  T(C): 36.7 (04-21-21 @ 05:12), Max: 37 (04-20-21 @ 10:00)  HR: 79 (04-21-21 @ 05:12) (66 - 79)  BP: 113/70 (04-21-21 @ 05:12) (113/70 - 147/87)  ABP: --  ABP(mean): --  RR: 16 (04-21-21 @ 05:12) (16 - 20)  SpO2: 99% (04-21-21 @ 05:12) (96% - 100%)  Wt(kg): --  CVP(mm Hg): --      04-20 @ 07:01  -  04-21 @ 07:00  --------------------------------------------------------  IN:    Lactated Ringers: 2400 mL    Oral Fluid: 240 mL  Total IN: 2640 mL    OUT:    Voided (mL): 1650 mL  Total OUT: 1650 mL    Total NET: 990 mL        PHYSICAL EXAM:  GEN: No acute distress, resting comfortably   CV: RRR  RESP: No increased work of breathing   ABD: soft, nontender, nondistended, no rebound or guarding   EXT: warm and well perfused      LABS:    CBC (04-21 @ 06:44)                              13.3                           6.12    )----------------(  164        --    % Neutrophils, --    % Lymphocytes, ANC: --                                  40.6    CBC (04-20 @ 06:01)                              14.0                           7.82    )----------------(  181        --    % Neutrophils, --    % Lymphocytes, ANC: --                                  43.3      BMP (04-21 @ 06:44)             139     |  106     |  12    		Ca++ --      Ca 8.8                ---------------------------------( 115<H>		Mg 2.1                4.2     |  23      |  0.81  			Ph 3.5     BMP (04-20 @ 06:01)             136     |  102     |  13    		Ca++ --      Ca 9.2                ---------------------------------( 83    		Mg 2.2                4.1     |  24      |  0.96  			Ph 3.3       LFTs (04-21 @ 06:44)      TPro 6.5 / Alb 3.6 / TBili 0.5 / DBili -- / AST 63<H> / <H> / AlkPhos 187<H>  LFTs (04-20 @ 06:01)      TPro 7.1 / Alb 4.0 / TBili 1.1 / DBili -- / <H> / <H> / AlkPhos 228<H>    Coags (04-21 @ 06:44)  aPTT 28.0 / INR 1.08 / PT 12.4

## 2021-04-26 LAB — SURGICAL PATHOLOGY STUDY: SIGNIFICANT CHANGE UP

## 2021-04-30 PROBLEM — E78.5 HYPERLIPIDEMIA, UNSPECIFIED: Chronic | Status: ACTIVE | Noted: 2021-04-19

## 2021-05-04 ENCOUNTER — APPOINTMENT (OUTPATIENT)
Dept: TRAUMA SURGERY | Facility: HOSPITAL | Age: 46
End: 2021-05-04
Payer: MEDICAID

## 2021-05-04 VITALS
DIASTOLIC BLOOD PRESSURE: 82 MMHG | SYSTOLIC BLOOD PRESSURE: 138 MMHG | BODY MASS INDEX: 25.74 KG/M2 | HEART RATE: 87 BPM | HEIGHT: 67 IN | TEMPERATURE: 97.2 F | WEIGHT: 164 LBS

## 2021-05-04 DIAGNOSIS — K81.1 CHRONIC CHOLECYSTITIS: ICD-10-CM

## 2021-05-04 PROCEDURE — 99024 POSTOP FOLLOW-UP VISIT: CPT

## 2021-05-04 NOTE — ASSESSMENT
[FreeTextEntry1] : s/p laparoscopic cholecystectomy\par \par Without complaints\par ROS normal\par \par Anicteric sclera\par Incision C/D/I\par \par Pathology discussed\par \par Follow up prn\par No restrictions to diet and activity\par May get COVID vaccine
unknown

## 2021-05-18 ENCOUNTER — EMERGENCY (EMERGENCY)
Facility: HOSPITAL | Age: 46
LOS: 1 days | Discharge: HOME | End: 2021-05-18
Attending: EMERGENCY MEDICINE | Admitting: EMERGENCY MEDICINE
Payer: MEDICAID

## 2021-05-18 VITALS
TEMPERATURE: 98 F | OXYGEN SATURATION: 100 % | DIASTOLIC BLOOD PRESSURE: 77 MMHG | HEIGHT: 67.01 IN | HEART RATE: 75 BPM | SYSTOLIC BLOOD PRESSURE: 129 MMHG | RESPIRATION RATE: 16 BRPM

## 2021-05-18 VITALS
SYSTOLIC BLOOD PRESSURE: 126 MMHG | HEART RATE: 63 BPM | DIASTOLIC BLOOD PRESSURE: 85 MMHG | TEMPERATURE: 98 F | OXYGEN SATURATION: 100 % | RESPIRATION RATE: 17 BRPM

## 2021-05-18 DIAGNOSIS — Z98.890 OTHER SPECIFIED POSTPROCEDURAL STATES: Chronic | ICD-10-CM

## 2021-05-18 DIAGNOSIS — Z83.79 FAMILY HISTORY OF OTHER DISEASES OF THE DIGESTIVE SYSTEM: Chronic | ICD-10-CM

## 2021-05-18 LAB
ALBUMIN SERPL ELPH-MCNC: 3.9 G/DL — SIGNIFICANT CHANGE UP (ref 3.3–5)
ALP SERPL-CCNC: 77 U/L — SIGNIFICANT CHANGE UP (ref 40–120)
ALT FLD-CCNC: 29 U/L — SIGNIFICANT CHANGE UP (ref 4–41)
ANION GAP SERPL CALC-SCNC: 9 MMOL/L — SIGNIFICANT CHANGE UP (ref 7–14)
APPEARANCE UR: CLEAR — SIGNIFICANT CHANGE UP
AST SERPL-CCNC: 21 U/L — SIGNIFICANT CHANGE UP (ref 4–40)
BACTERIA # UR AUTO: NEGATIVE — SIGNIFICANT CHANGE UP
BASOPHILS # BLD AUTO: 0.03 K/UL — SIGNIFICANT CHANGE UP (ref 0–0.2)
BASOPHILS NFR BLD AUTO: 0.5 % — SIGNIFICANT CHANGE UP (ref 0–2)
BILIRUB SERPL-MCNC: 0.3 MG/DL — SIGNIFICANT CHANGE UP (ref 0.2–1.2)
BILIRUB UR-MCNC: NEGATIVE — SIGNIFICANT CHANGE UP
BLOOD GAS VENOUS COMPREHENSIVE RESULT: SIGNIFICANT CHANGE UP
BUN SERPL-MCNC: 11 MG/DL — SIGNIFICANT CHANGE UP (ref 7–23)
CALCIUM SERPL-MCNC: 9.1 MG/DL — SIGNIFICANT CHANGE UP (ref 8.4–10.5)
CHLORIDE SERPL-SCNC: 105 MMOL/L — SIGNIFICANT CHANGE UP (ref 98–107)
CO2 SERPL-SCNC: 25 MMOL/L — SIGNIFICANT CHANGE UP (ref 22–31)
COLOR SPEC: SIGNIFICANT CHANGE UP
CREAT SERPL-MCNC: 0.91 MG/DL — SIGNIFICANT CHANGE UP (ref 0.5–1.3)
D DIMER BLD IA.RAPID-MCNC: 310 NG/ML DDU — HIGH
DIFF PNL FLD: NEGATIVE — SIGNIFICANT CHANGE UP
EOSINOPHIL # BLD AUTO: 0.15 K/UL — SIGNIFICANT CHANGE UP (ref 0–0.5)
EOSINOPHIL NFR BLD AUTO: 2.7 % — SIGNIFICANT CHANGE UP (ref 0–6)
EPI CELLS # UR: 0 /HPF — SIGNIFICANT CHANGE UP (ref 0–5)
GLUCOSE SERPL-MCNC: 101 MG/DL — HIGH (ref 70–99)
GLUCOSE UR QL: NEGATIVE — SIGNIFICANT CHANGE UP
HCT VFR BLD CALC: 38.1 % — LOW (ref 39–50)
HGB BLD-MCNC: 12.2 G/DL — LOW (ref 13–17)
IANC: 2.86 K/UL — SIGNIFICANT CHANGE UP (ref 1.5–8.5)
IMM GRANULOCYTES NFR BLD AUTO: 0.2 % — SIGNIFICANT CHANGE UP (ref 0–1.5)
KETONES UR-MCNC: NEGATIVE — SIGNIFICANT CHANGE UP
LEUKOCYTE ESTERASE UR-ACNC: NEGATIVE — SIGNIFICANT CHANGE UP
LIDOCAIN IGE QN: 58 U/L — SIGNIFICANT CHANGE UP (ref 7–60)
LYMPHOCYTES # BLD AUTO: 1.94 K/UL — SIGNIFICANT CHANGE UP (ref 1–3.3)
LYMPHOCYTES # BLD AUTO: 34.7 % — SIGNIFICANT CHANGE UP (ref 13–44)
MCHC RBC-ENTMCNC: 28 PG — SIGNIFICANT CHANGE UP (ref 27–34)
MCHC RBC-ENTMCNC: 32 GM/DL — SIGNIFICANT CHANGE UP (ref 32–36)
MCV RBC AUTO: 87.6 FL — SIGNIFICANT CHANGE UP (ref 80–100)
MONOCYTES # BLD AUTO: 0.6 K/UL — SIGNIFICANT CHANGE UP (ref 0–0.9)
MONOCYTES NFR BLD AUTO: 10.7 % — SIGNIFICANT CHANGE UP (ref 2–14)
NEUTROPHILS # BLD AUTO: 2.86 K/UL — SIGNIFICANT CHANGE UP (ref 1.8–7.4)
NEUTROPHILS NFR BLD AUTO: 51.2 % — SIGNIFICANT CHANGE UP (ref 43–77)
NITRITE UR-MCNC: NEGATIVE — SIGNIFICANT CHANGE UP
NRBC # BLD: 0 /100 WBCS — SIGNIFICANT CHANGE UP
NRBC # FLD: 0 K/UL — SIGNIFICANT CHANGE UP
PH UR: 6.5 — SIGNIFICANT CHANGE UP (ref 5–8)
PLATELET # BLD AUTO: 178 K/UL — SIGNIFICANT CHANGE UP (ref 150–400)
POTASSIUM SERPL-MCNC: 3.9 MMOL/L — SIGNIFICANT CHANGE UP (ref 3.5–5.3)
POTASSIUM SERPL-SCNC: 3.9 MMOL/L — SIGNIFICANT CHANGE UP (ref 3.5–5.3)
PROT SERPL-MCNC: 6.4 G/DL — SIGNIFICANT CHANGE UP (ref 6–8.3)
PROT UR-MCNC: ABNORMAL
RBC # BLD: 4.35 M/UL — SIGNIFICANT CHANGE UP (ref 4.2–5.8)
RBC # FLD: 13.1 % — SIGNIFICANT CHANGE UP (ref 10.3–14.5)
RBC CASTS # UR COMP ASSIST: 0 /HPF — SIGNIFICANT CHANGE UP (ref 0–4)
SODIUM SERPL-SCNC: 139 MMOL/L — SIGNIFICANT CHANGE UP (ref 135–145)
SP GR SPEC: >1.05 (ref 1.01–1.02)
TROPONIN T, HIGH SENSITIVITY RESULT: 6 NG/L — SIGNIFICANT CHANGE UP
TROPONIN T, HIGH SENSITIVITY RESULT: 7 NG/L — SIGNIFICANT CHANGE UP
UROBILINOGEN FLD QL: SIGNIFICANT CHANGE UP
WBC # BLD: 5.59 K/UL — SIGNIFICANT CHANGE UP (ref 3.8–10.5)
WBC # FLD AUTO: 5.59 K/UL — SIGNIFICANT CHANGE UP (ref 3.8–10.5)
WBC UR QL: 0 /HPF — SIGNIFICANT CHANGE UP (ref 0–5)

## 2021-05-18 PROCEDURE — 74177 CT ABD & PELVIS W/CONTRAST: CPT | Mod: 26

## 2021-05-18 PROCEDURE — 93010 ELECTROCARDIOGRAM REPORT: CPT

## 2021-05-18 PROCEDURE — 71046 X-RAY EXAM CHEST 2 VIEWS: CPT | Mod: 26

## 2021-05-18 PROCEDURE — 93308 TTE F-UP OR LMTD: CPT | Mod: 26

## 2021-05-18 PROCEDURE — 76705 ECHO EXAM OF ABDOMEN: CPT | Mod: 26

## 2021-05-18 PROCEDURE — 99285 EMERGENCY DEPT VISIT HI MDM: CPT | Mod: 25

## 2021-05-18 PROCEDURE — 71275 CT ANGIOGRAPHY CHEST: CPT | Mod: 26

## 2021-05-18 RX ORDER — SODIUM CHLORIDE 9 MG/ML
1000 INJECTION, SOLUTION INTRAVENOUS ONCE
Refills: 0 | Status: COMPLETED | OUTPATIENT
Start: 2021-05-18 | End: 2021-05-18

## 2021-05-18 RX ORDER — MORPHINE SULFATE 50 MG/1
4 CAPSULE, EXTENDED RELEASE ORAL ONCE
Refills: 0 | Status: DISCONTINUED | OUTPATIENT
Start: 2021-05-18 | End: 2021-05-18

## 2021-05-18 RX ADMIN — MORPHINE SULFATE 4 MILLIGRAM(S): 50 CAPSULE, EXTENDED RELEASE ORAL at 20:30

## 2021-05-18 RX ADMIN — SODIUM CHLORIDE 1000 MILLILITER(S): 9 INJECTION, SOLUTION INTRAVENOUS at 20:30

## 2021-05-18 RX ADMIN — MORPHINE SULFATE 4 MILLIGRAM(S): 50 CAPSULE, EXTENDED RELEASE ORAL at 16:36

## 2021-05-18 NOTE — CONSULT NOTE ADULT - ASSESSMENT
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

## 2021-05-18 NOTE — CONSULT NOTE ADULT - SUBJECTIVE AND OBJECTIVE BOX
GENERAL SURGERY CONSULT NOTE    Patient is a 46y old  Male who presents with a chief complaint of     HPI:      10-points review of system performed with pertinent negative and postive findings documented in the HPI     PAST MEDICAL & SURGICAL HISTORY:  Hyperlipidemia    Cholelithiasis    Pancreatitis    H/O excision of mass  h/o excision of multiple soft tissue lesions    FHx: cholecystectomy      [  ] No significant past history as reviewed with the patient and family    FAMILY HISTORY:  No pertinent family history in first degree relatives    : Family history not pertinent as reviewed with the patient and family    SOCIAL HISTORY: No pertinent social history    MEDICATIONS  (STANDING):    MEDICATIONS  (PRN):    Allergies    No Known Allergies    Intolerances        Vital Signs Last 24 Hrs  T(C): 36.8 (18 May 2021 21:11), Max: 36.8 (18 May 2021 15:45)  T(F): 98.2 (18 May 2021 21:11), Max: 98.3 (18 May 2021 15:45)  HR: 63 (18 May 2021 21:11) (63 - 75)  BP: 126/85 (18 May 2021 21:11) (126/85 - 129/77)  BP(mean): --  RR: 17 (18 May 2021 21:11) (16 - 17)  SpO2: 100% (18 May 2021 21:11) (99% - 100%)  Daily Height in cm: 170.2 (18 May 2021 15:45)    Daily     Exam:  General:  HEENT:  Resp:  Chest:   Abd:  Ext:  Neuro:                          12.2   5.59  )-----------( 178      ( 18 May 2021 16:50 )             38.1     05-18    139  |  105  |  11  ----------------------------<  101<H>  3.9   |  25  |  0.91    Ca    9.1      18 May 2021 16:50    TPro  6.4  /  Alb  3.9  /  TBili  0.3  /  DBili  x   /  AST  21  /  ALT  29  /  AlkPhos  77  05-18      Urinalysis Basic - ( 18 May 2021 19:33 )    Color: Light Yellow / Appearance: Clear / SG: >1.050 / pH: x  Gluc: x / Ketone: Negative  / Bili: Negative / Urobili: <2 mg/dL   Blood: x / Protein: Trace / Nitrite: Negative   Leuk Esterase: Negative / RBC: 0 /HPF / WBC 0 /HPF   Sq Epi: x / Non Sq Epi: 0 /HPF / Bacteria: Negative        IMAGING STUDIES:             GENERAL SURGERY CONSULT NOTE    Patient is a 46y old  Male who presents with a chief complaint of abdominal pain     HPI: 45 yo male with recent laparoscopic cholecystectomy done about 1 month ago in April presented with 5 days of right upper quadrant abdominal pain. The pain is almost pleuritic, worsening on inspiration. He denies fever, chills, nausea or vomiting. He was seen in the outpatient for his post-op follow up 2 weeks post-op and has been doing well until the onset of his current symptoms. He has normal GI function and able to tolerating PO.     10-points review of system performed with pertinent negative and postive findings documented in the HPI     PAST MEDICAL & SURGICAL HISTORY:  Hyperlipidemia  Cholelithiasis  Pancreatitis  H/O excision of mass  h/o excision of multiple soft tissue lesions  FHx: cholecystectomy    FAMILY HISTORY: No pertinent family history in first degree relatives    SOCIAL HISTORY: No pertinent social history    Allergies: No Known Allergies    Vital Signs Last 24 Hrs  T(C): 36.8 (18 May 2021 21:11), Max: 36.8 (18 May 2021 15:45)  T(F): 98.2 (18 May 2021 21:11), Max: 98.3 (18 May 2021 15:45)  HR: 63 (18 May 2021 21:11) (63 - 75)  BP: 126/85 (18 May 2021 21:11) (126/85 - 129/77)  BP(mean): --  RR: 17 (18 May 2021 21:11) (16 - 17)  SpO2: 100% (18 May 2021 21:11) (99% - 100%)  Daily Height in cm: 170.2 (18 May 2021 15:45)                            12.2   5.59  )-----------( 178      ( 18 May 2021 16:50 )             38.1     05-18    139  |  105  |  11  ----------------------------<  101<H>  3.9   |  25  |  0.91    Ca    9.1      18 May 2021 16:50    TPro  6.4  /  Alb  3.9  /  TBili  0.3  /  DBili  x   /  AST  21  /  ALT  29  /  AlkPhos  77  05-18    Urinalysis Basic - ( 18 May 2021 19:33 )  Color: Light Yellow / Appearance: Clear / SG: >1.050 / pH: x  Gluc: x / Ketone: Negative  / Bili: Negative / Urobili: <2 mg/dL   Blood: x / Protein: Trace / Nitrite: Negative   Leuk Esterase: Negative / RBC: 0 /HPF / WBC 0 /HPF   Sq Epi: x / Non Sq Epi: 0 /HPF / Bacteria: Negative    IMAGING STUDIES:  EXAM:  CT ABDOMEN AND PELVIS IC    EXAM:  CT ANGIO CHEST (W)AW IC      *** ADDENDUM 05/18/2021  ***  Addendum: Please note that the finding represents an omental infarct.  *** END OF ADDENDUM 05/18/2021  ***    FINDINGS:  CHEST:  LUNGS AND LARGE AIRWAYS: Patent central airways. No pulmonary nodules.  PLEURA: No pleural effusion.  VESSELS: No pulmonary embolism.  HEART: Heart size is normal. No pericardial effusion.  MEDIASTINUM AND STANISLAV: No lymphadenopathy.  CHEST WALL AND LOWER NECK: Within normal limits.  ABDOMEN AND PELVIS:  LIVER: Within normal limits.  BILE DUCTS: Normal caliber.  GALLBLADDER: Cholecystectomy.  SPLEEN: Within normal limits.  PANCREAS: Within normal limits.  ADRENALS: Within normal limits.  KIDNEYS/URETERS: No renal stones or hydronephrosis.  BLADDER: Minimally distended.  REPRODUCTIVE ORGANS: Prostate is normal in size.  BOWEL: No bowel obstruction. Appendix is normal. Inflammatory changes adjacent to the hepatic flexure suggesting mesenteric infarct  PERITONEUM: No ascites or pneumoperitoneum.  VESSELS: Within normal limits.  RETROPERITONEUM/LYMPH NODES: No lymphadenopathy.  ABDOMINAL WALL: Postsurgical changes.  BONES: Within normal limits.    IMPRESSION:  1.  No pulmonary embolism.  Suggestion right upper quadrant mesenteric infarct

## 2021-05-18 NOTE — ED PROVIDER NOTE - PATIENT PORTAL LINK FT
You can access the FollowMyHealth Patient Portal offered by [facility] by registering at the following website: [email or web address] By joining [email or web address]’s FollowMyHealth portal, you will also be able to view your health information using other applications (apps) compatible with our system.

## 2021-05-18 NOTE — ED PROVIDER NOTE - PROGRESS NOTE DETAILS
CLEMENT Chan- as per surgery pt with omentum infarct, not mesenteric. recommending NSAIDs and dc home, outpt follow up. pt pain well controlled amenable for dc home

## 2021-05-18 NOTE — ED PROVIDER NOTE - NSFOLLOWUPINSTRUCTIONS_ED_ALL_ED_FT
Follow up with General Surgery  Take Motrin 600mg every 8hrs with food for pain.    Worsening, continued or new concerning symptoms return to the emergency department.

## 2021-05-18 NOTE — ED PROVIDER NOTE - ATTENDING CONTRIBUTION TO CARE
Dr Bloch, ATTENDING MD-  I performed a face to face bedside interview with patient regarding history of present illness, review of symptoms and past medical history. I completed an independent physical exam.  I have discussed patient's plan of care with PA.   Documentation as above in the note.  Patient examined, well appearing , NAD HEENT nml heart s1s2, lungs clear, abd soft, tender ruq, no CVA tenderness, extrem no edema, skin nml, occasional lipomas arm, abd.neuro nml

## 2021-05-18 NOTE — ED ADULT TRIAGE NOTE - CHIEF COMPLAINT QUOTE
Pt w/ hx of gallstones s/p cholecystectomy 4/21 c/o RUQ abdominal pain worse with deep inhalation walking and eating

## 2021-05-18 NOTE — ED PROVIDER NOTE - OBJECTIVE STATEMENT
45 y/o male with pmhx of cholelithiasis s/p cholecystectomy on 4/21 at [facility], pancreatitis, HLD, presents to ED c/o pleuritic RUQ pain x 5 days. Pt states it hurts to take a deep breath. Non-radiating. No fevers, chills, n/v/d, CP, SOB, cough, dizziness.

## 2021-05-18 NOTE — ED PROVIDER NOTE - CLINICAL SUMMARY MEDICAL DECISION MAKING FREE TEXT BOX
45 y/o male with pmhx of cholelithiasis s/p cholecystectomy on 4/21 at [facility], pancreatitis, HLD, presents to ED c/o pleuritic RUQ pain x 5 days. Pt states it hurts to take a deep breath. Non-radiating. No fevers, chills, n/v/d, CP, SOB, cough, dizziness. on exam, pt with ruq tenderness, NAD. no calf tenderness. plan to check d-dimer r/o pe, ekg, cxr, labs, lipase r/o pancreatitis and CT abdomen and pelvis w/ iv contrast r/o abscess, post op complication.

## 2021-06-02 NOTE — PATIENT PROFILE ADULT - DO YOU FEEL LIKE HURTING YOURSELF OR OTHERS?
no Bexarotene Pregnancy And Lactation Text: This medication is Pregnancy Category X and should not be given to women who are pregnant or may become pregnant. This medication should not be used if you are breast feeding.

## 2025-07-09 ENCOUNTER — EMERGENCY (EMERGENCY)
Facility: HOSPITAL | Age: 50
LOS: 1 days | End: 2025-07-09
Admitting: EMERGENCY MEDICINE
Payer: MEDICAID

## 2025-07-09 VITALS
OXYGEN SATURATION: 96 % | HEART RATE: 73 BPM | HEIGHT: 67 IN | DIASTOLIC BLOOD PRESSURE: 92 MMHG | SYSTOLIC BLOOD PRESSURE: 164 MMHG | RESPIRATION RATE: 16 BRPM | TEMPERATURE: 99 F | WEIGHT: 169.98 LBS

## 2025-07-09 VITALS
RESPIRATION RATE: 18 BRPM | OXYGEN SATURATION: 100 % | DIASTOLIC BLOOD PRESSURE: 76 MMHG | HEART RATE: 66 BPM | SYSTOLIC BLOOD PRESSURE: 133 MMHG | TEMPERATURE: 98 F

## 2025-07-09 DIAGNOSIS — Z98.890 OTHER SPECIFIED POSTPROCEDURAL STATES: Chronic | ICD-10-CM

## 2025-07-09 DIAGNOSIS — Z83.79 FAMILY HISTORY OF OTHER DISEASES OF THE DIGESTIVE SYSTEM: Chronic | ICD-10-CM

## 2025-07-09 PROBLEM — K85.90 ACUTE PANCREATITIS WITHOUT NECROSIS OR INFECTION, UNSPECIFIED: Chronic | Status: ACTIVE | Noted: 2021-05-18

## 2025-07-09 PROBLEM — K80.20 CALCULUS OF GALLBLADDER WITHOUT CHOLECYSTITIS WITHOUT OBSTRUCTION: Chronic | Status: ACTIVE | Noted: 2021-05-18

## 2025-07-09 PROCEDURE — 99284 EMERGENCY DEPT VISIT MOD MDM: CPT

## 2025-07-09 RX ORDER — LIDOCAINE HYDROCHLORIDE 20 MG/ML
1 JELLY TOPICAL ONCE
Refills: 0 | Status: COMPLETED | OUTPATIENT
Start: 2025-07-09 | End: 2025-07-09

## 2025-07-09 RX ORDER — ACETAMINOPHEN 500 MG/5ML
2 LIQUID (ML) ORAL
Qty: 56 | Refills: 0
Start: 2025-07-09 | End: 2025-07-15

## 2025-07-09 RX ORDER — IBUPROFEN 200 MG
1 TABLET ORAL
Qty: 21 | Refills: 0
Start: 2025-07-09 | End: 2025-07-15

## 2025-07-09 RX ORDER — LIDOCAINE HYDROCHLORIDE 20 MG/ML
1 JELLY TOPICAL
Qty: 2 | Refills: 0
Start: 2025-07-09 | End: 2025-07-13

## 2025-07-09 RX ORDER — CYCLOBENZAPRINE HYDROCHLORIDE 15 MG/1
1 CAPSULE, EXTENDED RELEASE ORAL
Qty: 15 | Refills: 0
Start: 2025-07-09 | End: 2025-07-13

## 2025-07-09 RX ORDER — ACETAMINOPHEN 500 MG/5ML
975 LIQUID (ML) ORAL ONCE
Refills: 0 | Status: COMPLETED | OUTPATIENT
Start: 2025-07-09 | End: 2025-07-09

## 2025-07-09 RX ORDER — KETOROLAC TROMETHAMINE 30 MG/ML
30 INJECTION, SOLUTION INTRAMUSCULAR; INTRAVENOUS ONCE
Refills: 0 | Status: DISCONTINUED | OUTPATIENT
Start: 2025-07-09 | End: 2025-07-09

## 2025-07-09 RX ADMIN — Medication 975 MILLIGRAM(S): at 17:49

## 2025-07-09 RX ADMIN — KETOROLAC TROMETHAMINE 30 MILLIGRAM(S): 30 INJECTION, SOLUTION INTRAMUSCULAR; INTRAVENOUS at 17:49

## 2025-07-09 RX ADMIN — LIDOCAINE HYDROCHLORIDE 1 PATCH: 20 JELLY TOPICAL at 17:49
